# Patient Record
Sex: FEMALE | Race: WHITE | URBAN - METROPOLITAN AREA
[De-identification: names, ages, dates, MRNs, and addresses within clinical notes are randomized per-mention and may not be internally consistent; named-entity substitution may affect disease eponyms.]

---

## 2020-06-30 ENCOUNTER — OFFICE VISIT (OUTPATIENT)
Dept: URBAN - METROPOLITAN AREA TELEHEALTH 2 | Facility: TELEHEALTH | Age: 35
End: 2020-06-30
Payer: COMMERCIAL

## 2020-06-30 DIAGNOSIS — K60.2 ANAL FISSURE: ICD-10-CM

## 2020-06-30 DIAGNOSIS — K59.01 CONSTIPATION: ICD-10-CM

## 2020-06-30 DIAGNOSIS — K52.89 OTHER AND UNSPECIFIED NONINFECTIOUS GASTROENTERITIS AND COLITIS: ICD-10-CM

## 2020-06-30 PROBLEM — 30037006 ANAL FISSURE: Status: ACTIVE | Noted: 2020-06-30

## 2020-06-30 PROBLEM — 64226004 COLITIS: Status: ACTIVE | Noted: 2020-06-30

## 2020-06-30 PROBLEM — 14760008 CONSTIPATION: Status: ACTIVE | Noted: 2020-06-30

## 2020-06-30 PROCEDURE — G9903 PT SCRN TBCO ID AS NON USER: HCPCS | Performed by: INTERNAL MEDICINE

## 2020-06-30 PROCEDURE — 99214 OFFICE O/P EST MOD 30 MIN: CPT | Performed by: INTERNAL MEDICINE

## 2020-06-30 PROCEDURE — 1036F TOBACCO NON-USER: CPT | Performed by: INTERNAL MEDICINE

## 2020-06-30 PROCEDURE — G8420 CALC BMI NORM PARAMETERS: HCPCS | Performed by: INTERNAL MEDICINE

## 2020-06-30 PROCEDURE — G8427 DOCREV CUR MEDS BY ELIG CLIN: HCPCS | Performed by: INTERNAL MEDICINE

## 2020-06-30 RX ORDER — MESALAMINE 4 G/60ML
AS DIRECTED SUSPENSION RECTAL ONCE A DAY
Qty: 30 | Refills: 1 | OUTPATIENT
Start: 2020-06-30 | End: 2020-08-29

## 2020-06-30 RX ORDER — LINACLOTIDE 290 UG/1
TAKE 1 CAPSULE (290 MCG) BY ORAL ROUTE ONCE DAILY ON AN EMPTY STOMACH AT LEAST 30 MINUTES BEFORE 1ST MEAL OF THE DAY FOR 30 DAYS CAPSULE, GELATIN COATED ORAL 1
Qty: 30 | Refills: 2 | Status: DISCONTINUED | COMMUNITY
Start: 2020-04-22 | End: 2020-07-21

## 2020-06-30 RX ORDER — MESALAMINE 1.2 G/1
TAKE 2 TABLETS (2.4 GRAM) BY ORAL ROUTE ONCE DAILY WITH A MEAL FOR 30 DAYS TABLET, DELAYED RELEASE ORAL 1
Qty: 60 | Refills: 2 | Status: DISCONTINUED | COMMUNITY
Start: 2020-04-23 | End: 2020-07-22

## 2020-06-30 NOTE — HPI-TODAY'S VISIT:
DOING OKAY, HAVING SAME ISSUES, CONSTIPATION NO BM FOR 7-10DAYS, , LOT OF MUCUS WITH BLOOD MIXED IN IT. RECTAL AND ABDOMINAL PAIN. LIALDA , JUST RAN OUT OF IT 3 WEEKS. LIALDA AT TIMES WAS HELPING, NOT ON ANY LINZESS.  NO WT LOSS. WT UP AND DOWN, NO FEVER , NO ANTIBIO=TICS IN LAST 2 MONTHS.  NOT SURE IF STEROIDS HELPED.

## 2020-07-01 ENCOUNTER — ERX REFILL RESPONSE (OUTPATIENT)
Dept: URBAN - METROPOLITAN AREA TELEHEALTH 2 | Facility: TELEHEALTH | Age: 35
End: 2020-07-01

## 2020-07-01 RX ORDER — MESALAMINE 4 G/60ML
AS DIRECTED SUSPENSION RECTAL ONCE A DAY
Qty: 30 | Refills: 1 | OUTPATIENT

## 2020-07-01 RX ORDER — MESALAMINE 4 G/60 ML
AS DIRECTED ONCE A DAY RECTAL 30 DAYS KIT RECTAL
Qty: 30 KIT | Refills: 1 | OUTPATIENT

## 2020-07-02 ENCOUNTER — ERX REFILL RESPONSE (OUTPATIENT)
Dept: URBAN - METROPOLITAN AREA TELEHEALTH 2 | Facility: TELEHEALTH | Age: 35
End: 2020-07-02

## 2020-07-02 RX ORDER — MESALAMINE 4 G/60 ML
AS DIRECTED ONCE A DAY RECTAL 30 DAYS-NEEDS PA KIT RECTAL
Qty: 1 KIT | Refills: 1 | OUTPATIENT

## 2020-07-02 RX ORDER — MESALAMINE 4 G/60ML
AS DIRECTED SUSPENSION RECTAL ONCE A DAY
Qty: 1 | Refills: 1 | OUTPATIENT

## 2024-02-21 ENCOUNTER — LAB (OUTPATIENT)
Dept: URBAN - METROPOLITAN AREA CLINIC 82 | Facility: CLINIC | Age: 39
End: 2024-02-21

## 2024-02-21 ENCOUNTER — OV EP (OUTPATIENT)
Dept: URBAN - METROPOLITAN AREA CLINIC 82 | Facility: CLINIC | Age: 39
End: 2024-02-21
Payer: COMMERCIAL

## 2024-02-21 VITALS
DIASTOLIC BLOOD PRESSURE: 76 MMHG | HEART RATE: 89 BPM | WEIGHT: 122.2 LBS | SYSTOLIC BLOOD PRESSURE: 110 MMHG | BODY MASS INDEX: 21.65 KG/M2 | TEMPERATURE: 98.6 F | HEIGHT: 63 IN

## 2024-02-21 DIAGNOSIS — R10.84 ABDOMINAL CRAMPING, GENERALIZED: ICD-10-CM

## 2024-02-21 DIAGNOSIS — K52.89 (LYMPHOCYTIC) MICROSCOPIC COLITIS: ICD-10-CM

## 2024-02-21 DIAGNOSIS — K62.5 RECTAL BLEEDING: ICD-10-CM

## 2024-02-21 DIAGNOSIS — K59.09 CHANGE IN BOWEL MOVEMENTS INTERMITTENT CONSTIPATION. URGENCY IN THE MORNING.: ICD-10-CM

## 2024-02-21 PROBLEM — 54586004: Status: ACTIVE | Noted: 2024-02-21

## 2024-02-21 PROBLEM — 118948005: Status: ACTIVE | Noted: 2024-02-21

## 2024-02-21 PROBLEM — 12063002: Status: ACTIVE | Noted: 2024-02-21

## 2024-02-21 PROCEDURE — 99204 OFFICE O/P NEW MOD 45 MIN: CPT | Performed by: INTERNAL MEDICINE

## 2024-02-21 RX ORDER — MESALAMINE 4 G/60 ML
AS DIRECTED ONCE A DAY RECTAL 30 DAYS-NEEDS PA KIT RECTAL
Qty: 1 KIT | Refills: 1 | Status: ACTIVE | COMMUNITY

## 2024-02-21 RX ORDER — MESALAMINE 1000 MG/1
1 SUPPOSITORY AT BEDTIME SUPPOSITORY RECTAL ONCE A DAY
Qty: 30 | OUTPATIENT
Start: 2024-02-21 | End: 2024-03-22

## 2024-02-21 NOTE — HPI-TODAY'S VISIT:
MOVED TO [facility], NOT SEEING ANY GASTROENTEROLOGY  LAST ONE WE SAW 5 YEARS BACK,  STOMACH FOR 6 MONTHS NOT GOOD, EXTREEME STOMACH PAIN, LOT OF MUCUS IN STOOL, DOES HAVE BLOOD , NOT USED ANY MEDICATIONS  IN LAST SIX MONTHS NO INSURANCE  NOT ON ANY MEDDICATION SINCE 2018  BM DEPENDS ON IF CONSTIPATED EXTREMELY OR DIARRHEA, 3 DAYS NO BM THEN BLOW OUT  WT LOSS, NOW STARTED GAINED  NO JOINT OR EYE PROBLEM  EAT OKAY.  DEPENDS ON PAIN  NO ANTIBIOITCS IN 2 MONTHS.